# Patient Record
Sex: FEMALE | URBAN - NONMETROPOLITAN AREA
[De-identification: names, ages, dates, MRNs, and addresses within clinical notes are randomized per-mention and may not be internally consistent; named-entity substitution may affect disease eponyms.]

---

## 2024-11-20 ENCOUNTER — TELEPHONE (OUTPATIENT)
Dept: NEUROSURGERY | Age: 29
End: 2024-11-20

## 2024-11-20 NOTE — TELEPHONE ENCOUNTER
[facility] New Patient Questionnaire    Diagnosis/Reason for Referral?    Low back pain, unspecified     2. Who is completing questionnaire?      Patient  Caregiver Family      3. Has the patient had any previous spinal/brain surgeries?  NO      A. If yes, what is the name of the facility in which the surgery was performed?       B. Procedure/Surgery performed?       C. Who was the surgeon?       D. When was the surgery?    MM/YY       E. Did the patient improve after the surgery?        4. Is this a second opinion?   If yes, Dr. Lopez would like to review patient first before making the appointment.      5. Have MRI Images been obtain within the last year?     Yes  No      XR  CT     If yes, where was the imaging performed?  [facility]   If yes, what part of the body?      Lumbar  Cervical  Thoracic  Brain     If yes, when was it obtained?      7/1/24    Note: if the scan was performed at a facility other than [facility], the disc will need to be brought to the appointment or we need to reach out to obtain the disc.     A. Was the patient instructed to provide the disc?      Yes   No      8. Has the patient had a NCV/EMG within the last year?      Yes  No     If yes, where was it performed and date?      MM/YY  Location:      9. Has the patient been to Physical Therapy?      Yes  No     If yes, what location, how long attended, and last visit?    Location: [facility]        Therapy Lasted:    Date of Last Visit: TWO MONTHS AGO      10. Has the patient been to Pain Management?     Yes  No     If yes, what location and last visit     Location:   Last Visit:   Is it helping?

## 2025-01-08 ENCOUNTER — OFFICE VISIT (OUTPATIENT)
Dept: NEUROSURGERY | Age: 30
End: 2025-01-08
Payer: MEDICAID

## 2025-01-08 VITALS
WEIGHT: 293 LBS | HEIGHT: 64 IN | BODY MASS INDEX: 50.02 KG/M2 | HEART RATE: 103 BPM | SYSTOLIC BLOOD PRESSURE: 136 MMHG | DIASTOLIC BLOOD PRESSURE: 89 MMHG

## 2025-01-08 DIAGNOSIS — R20.8 DECREASED SENSATION OF LEG: ICD-10-CM

## 2025-01-08 DIAGNOSIS — G89.29 CHRONIC MIDLINE LOW BACK PAIN WITH LEFT-SIDED SCIATICA: ICD-10-CM

## 2025-01-08 DIAGNOSIS — M54.42 CHRONIC MIDLINE LOW BACK PAIN WITH LEFT-SIDED SCIATICA: ICD-10-CM

## 2025-01-08 DIAGNOSIS — M51.362 DEGENERATION OF INTERVERTEBRAL DISC OF LUMBAR REGION WITH DISCOGENIC BACK PAIN AND LOWER EXTREMITY PAIN: Primary | ICD-10-CM

## 2025-01-08 PROCEDURE — 99204 OFFICE O/P NEW MOD 45 MIN: CPT | Performed by: NURSE PRACTITIONER

## 2025-01-08 RX ORDER — CYCLOBENZAPRINE HCL 10 MG
TABLET ORAL
COMMUNITY
Start: 2024-11-11

## 2025-01-08 RX ORDER — HYDROCODONE BITARTRATE AND ACETAMINOPHEN 5; 325 MG/1; MG/1
TABLET ORAL
COMMUNITY
Start: 2025-01-07

## 2025-01-08 ASSESSMENT — ENCOUNTER SYMPTOMS
RESPIRATORY NEGATIVE: 1
EYES NEGATIVE: 1
BACK PAIN: 1
GASTROINTESTINAL NEGATIVE: 1

## 2025-01-08 NOTE — PROGRESS NOTES
Review of Systems   Constitutional: Negative.    HENT: Negative.     Eyes: Negative.    Respiratory: Negative.     Cardiovascular: Negative.    Gastrointestinal: Negative.    Genitourinary: Negative.    Musculoskeletal:  Positive for back pain and myalgias.   Skin: Negative.    Neurological:  Positive for tingling and weakness.   Endo/Heme/Allergies: Negative.    Psychiatric/Behavioral: Negative.

## 2025-01-08 NOTE — PROGRESS NOTES
[facility]  Office Visit      Chief Complaint   Patient presents with    New Patient    Results     XR lumbar results [facility]    Back Pain     Patient states that she has had back pain for 3 years. Patietn states her pain is located in the lower left side of her back. Patient states that she oftentimes has muscle spasms in the left leg. She does complain of occasional shooting pain in the middle of her back. Patient has tried PT at [facility] in the last 6 months but pt reports that when she stopped treatments her pain worsened. Patient has not seen pain management, she is taking flexeril as needed for pain relief.     Numbness     Patient states that she has numbness, tingling and weakness in the lower back and it will migrate into her left leg.        HISTORY OF PRESENT ILLNESS:    Ellen Oliva is a 29 y.o. female who presents with low back pain that has been present for 3 years.  No known injury.  The pain does radiate into the LEFT buttocks, posterior thigh, and calf.  Her pain is mostly located in the low back.  The patient complains of intermittent numbness of the left low back.  The leg pain starts randomly cannot recall a specific activity.      Her pain is not changed when going from a seated to standing position. Her pain is worsened with walking. Her pain is not changed when lying flat. Overall, indicative that the patient does not have a mechanical nature to their pain. She states that 80% of her pain is located in the back and 20% is leg pain.      The patient has underwent a non-operative treatment course that has included:  NSAIDs - ibuprofen   Muscle Relaxers - flexeril   Opiates - hydrocodone from a broken thumb yesterday   Physical Therapy ([facility] in the last 6 months back in 2024, when stopped the pain returned)        Of note she does use a vape and does not take blood thinning medications.               Past Medical History:   Diagnosis Date    PCOS (polycystic

## 2025-01-15 ENCOUNTER — TELEPHONE (OUTPATIENT)
Dept: NEUROSURGERY | Age: 30
End: 2025-01-15

## 2025-01-15 NOTE — TELEPHONE ENCOUNTER
MRI Denied,I have called [facility] and all notes were not received. A new case has been started and all PT Notes and office notes have been sent.

## 2025-01-16 NOTE — TELEPHONE ENCOUNTER
[facility] requested additional information as follows:  When did patient start Flexeril, I printed the med list and put 11/11/2024 and faxed back to [facility].   Still Pending at this time

## 2025-03-19 ENCOUNTER — OFFICE VISIT (OUTPATIENT)
Dept: NEUROSURGERY | Age: 30
End: 2025-03-19
Payer: MEDICAID

## 2025-03-19 VITALS
HEIGHT: 64 IN | WEIGHT: 293 LBS | DIASTOLIC BLOOD PRESSURE: 97 MMHG | SYSTOLIC BLOOD PRESSURE: 143 MMHG | BODY MASS INDEX: 50.02 KG/M2 | HEART RATE: 66 BPM

## 2025-03-19 DIAGNOSIS — M51.362 DEGENERATION OF INTERVERTEBRAL DISC OF LUMBAR REGION WITH DISCOGENIC BACK PAIN AND LOWER EXTREMITY PAIN: Primary | ICD-10-CM

## 2025-03-19 DIAGNOSIS — G89.29 CHRONIC MIDLINE LOW BACK PAIN WITH LEFT-SIDED SCIATICA: ICD-10-CM

## 2025-03-19 DIAGNOSIS — M54.42 CHRONIC MIDLINE LOW BACK PAIN WITH LEFT-SIDED SCIATICA: ICD-10-CM

## 2025-03-19 DIAGNOSIS — R20.8 DECREASED SENSATION OF LEG: ICD-10-CM

## 2025-03-19 PROCEDURE — 99214 OFFICE O/P EST MOD 30 MIN: CPT | Performed by: NURSE PRACTITIONER

## 2025-03-19 ASSESSMENT — ENCOUNTER SYMPTOMS
GASTROINTESTINAL NEGATIVE: 1
RESPIRATORY NEGATIVE: 1
BACK PAIN: 1
EYES NEGATIVE: 1

## 2025-03-19 NOTE — PROGRESS NOTES
[facility]  Office Visit      Chief Complaint   Patient presents with    Follow-up    Back Pain     Patient states that she has lower left sided back pain. Patient states that sitting, standing or walking for long periods of time increases her pain. Patient states that she has intermittent tingling in her feet. Patient states that she has intermittent LLE pain especially when standing. Patient is not currently using any medication for pain relief.     Results     MRI L Spine [facility]        HISTORY OF PRESENT ILLNESS:    Ellen Oliva is a 29 y.o. female who was initially evaluated on 1/8/2025 with low back pain that had been present for 3 years.  No known injury.  The pain did radiate into the LEFT buttocks, posterior thigh, and calf.  Her pain was mostly located in the low back.  She complained of intermittent numbness of the left low back.  She cannot recall the specific exacerbating factor.    Her pain is not changed when going from a seated to standing position. Her pain is worsened with walking. Her pain is not changed when lying flat. Overall, indicative that the patient does not have a mechanical nature to their pain. She states that 80% of her pain is located in the back and 20% is leg pain.    She returns today to review the MRI of the lumbar spine.  She continues to have back pain and pain into the left lower extremity.    The patient has underwent a non-operative treatment course that has included:  NSAIDs - ibuprofen   Muscle Relaxers - flexeril   Opiates - hydrocodone from a broken thumb yesterday   Physical Therapy ([facility] in the last 6 months back in 2024, when stopped the pain returned)        Of note she does use a vape and does not take blood thinning medications.               Past Medical History:   Diagnosis Date    PCOS (polycystic ovarian syndrome)     Vertigo        No past surgical history on file.    Current Outpatient Medications   Medication Sig Dispense Refill

## 2025-03-19 NOTE — PROGRESS NOTES
Review of Systems   Constitutional: Negative.    HENT: Negative.     Eyes: Negative.    Respiratory: Negative.     Cardiovascular: Negative.    Gastrointestinal: Negative.    Genitourinary: Negative.    Musculoskeletal:  Positive for back pain, joint pain and myalgias.   Skin: Negative.    Neurological:  Positive for tingling and weakness.   Endo/Heme/Allergies: Negative.    Psychiatric/Behavioral: Negative.